# Patient Record
Sex: FEMALE | Race: WHITE | NOT HISPANIC OR LATINO | URBAN - METROPOLITAN AREA
[De-identification: names, ages, dates, MRNs, and addresses within clinical notes are randomized per-mention and may not be internally consistent; named-entity substitution may affect disease eponyms.]

---

## 2019-01-01 ENCOUNTER — EMERGENCY (EMERGENCY)
Facility: HOSPITAL | Age: 84
LOS: 0 days | End: 2019-01-14
Attending: EMERGENCY MEDICINE
Payer: MEDICARE

## 2019-01-01 VITALS — TEMPERATURE: 97 F

## 2019-01-01 DIAGNOSIS — K59.00 CONSTIPATION, UNSPECIFIED: ICD-10-CM

## 2019-01-01 DIAGNOSIS — I10 ESSENTIAL (PRIMARY) HYPERTENSION: ICD-10-CM

## 2019-01-01 DIAGNOSIS — Z88.9 ALLERGY STATUS TO UNSPECIFIED DRUGS, MEDICAMENTS AND BIOLOGICAL SUBSTANCES: ICD-10-CM

## 2019-01-01 DIAGNOSIS — F03.90 UNSPECIFIED DEMENTIA WITHOUT BEHAVIORAL DISTURBANCE: ICD-10-CM

## 2019-01-01 DIAGNOSIS — F32.9 MAJOR DEPRESSIVE DISORDER, SINGLE EPISODE, UNSPECIFIED: ICD-10-CM

## 2019-01-01 DIAGNOSIS — I96 GANGRENE, NOT ELSEWHERE CLASSIFIED: ICD-10-CM

## 2019-01-01 DIAGNOSIS — I46.9 CARDIAC ARREST, CAUSE UNSPECIFIED: ICD-10-CM

## 2019-01-01 DIAGNOSIS — F41.9 ANXIETY DISORDER, UNSPECIFIED: ICD-10-CM

## 2019-01-01 DIAGNOSIS — E78.5 HYPERLIPIDEMIA, UNSPECIFIED: ICD-10-CM

## 2019-01-01 DIAGNOSIS — N39.0 URINARY TRACT INFECTION, SITE NOT SPECIFIED: ICD-10-CM

## 2019-01-01 PROCEDURE — 92950 HEART/LUNG RESUSCITATION CPR: CPT

## 2019-01-01 PROCEDURE — 99285 EMERGENCY DEPT VISIT HI MDM: CPT | Mod: 25

## 2019-01-14 NOTE — ED PROVIDER NOTE - CRITICAL CARE PROVIDED
consultation with other physicians/additional history taking/documentation/telephone consultation with the patient's family/direct patient care (not related to procedure)/interpretation of diagnostic studies

## 2019-01-14 NOTE — ED ADULT TRIAGE NOTE - CHIEF COMPLAINT QUOTE
ems states, " pT was found unresponsive at [facility] , last seen normal at 8am, asystole, 2 epis, 1 bicarb, 1d5wo in filed, intubated 7.5ett, Right Tib IO "

## 2019-01-14 NOTE — ED PROVIDER NOTE - PMH
Anxiety    Constipation    Dementia    Depressed    Dyslipidemia    HTN (Hypertension), Benign    UTI (Lower Urinary Tract Infection)

## 2019-01-14 NOTE — ED PROVIDER NOTE - OBJECTIVE STATEMENT
89 years old female by ems c/o cardiopulmonary arrest. EMS sts pt is from [facility] last seen ok was 8:00 am this morning. Pt was found unresponsive no pulses cpr started at the center 30 minutes prior arrival. EMS sts pt has been in asystole no palpable pulses et intubated with Yoruba 7.5 at [address] to right proximal tib epi x 3 bicarb x 1 dextro x 1 iv given. Pt arrived here in asystole on the monitor, no palpable carotid and femoral pulses bilaterally, crp continued, epi x 1 dextro x 1 iv are given and pt remain asystole on the monitor no palpable pulses bilateral femoral /carotid, pt is pronounced at 12:40pm.

## 2019-01-14 NOTE — ED PROVIDER NOTE - PHYSICAL EXAMINATION
pt is unresponsive No hematoma no wound to the scalp or face. Pupils are fixed and dilated to 5 mm bilaterally, no S1/S2 no spontaneous breathing, clear equal breath sounds with bagging, ET tube [place] Island[place] 7.5 21 cm at lip. et tube is inserted thru the vocal cord visualized by glidescope. 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

## 2019-01-14 NOTE — ED PROVIDER NOTE - PROGRESS NOTE DETAILS
Pt 's guardian Chong Anderson Pt 's guardian Chong Garcia [phone] is called and he is explained and notified about pt's condition. DEBO Contreras [phone] from the [facility] is called and sts pt was reported last time seen ok at 8: 00 am and found unresponsive by the nurse, cpr was started about 12:00 pm this noon. She sts pt's attending Dr. Uday Blake [phone]. Dr. Blake is notified and sts he is writing the death certification.

## 2019-01-14 NOTE — ED ADULT NURSE NOTE - CHIEF COMPLAINT QUOTE
ems states, " pT was found unresponsive at [facility] , last seen normal at 8am, asystole, 2 epis, 1 bicarb, 1d5wo in filed, intubated 7.5ett, Right Tib IO "

## 2019-01-15 LAB — GLUCOSE BLDC GLUCOMTR-MCNC: 47 MG/DL — LOW (ref 70–99)
